# Patient Record
(demographics unavailable — no encounter records)

---

## 2024-10-30 NOTE — HISTORY OF PRESENT ILLNESS
[FreeTextEntry1] : Maya Zimmerman is a 79-year-old female seen by our office for known pancreatic cyst, stable back in March 2024. Pt now presents with referral from Dr. Hairston after findings of low iron saturation on blood work.    Patient had blood work done with PMD. Her Hgb, TIBC, Ferritin, and Iron were normal, just a low %sat of iron.Pt denies any visual blood noted. Pt denies any dizziness, lethargy, or SOB. Pt reports to be feeling clinically well. Previous colonoscopy/endoscopy in 2017 with Dr. Springer, polyps removed. 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 Denies any abdominal pain, episodes of nausea or vomiting, bowel complaints such as diarrhea or constipation. Denies any overt GI bleeding such as melena or hematochezia. No change in BM's. No hematemesis. Good appetite. No weight loss. No jaundice.

## 2024-10-30 NOTE — ADDENDUM
[FreeTextEntry1] : I was present with the NP during the history and exam. I examined the patient and discussed the case with the NP and agree with the findings and plan as documented in the NP's note. Briefly, 79 year old woman with pancreatic cyst and prior anemia with AVM's s/p double balloon enteroscopy, here with low iron sat.   Patient does not wish to pursue endoscopic eval, despite being due for colonoscopy as well. As her hgb, iron normal, FOBT earlier this year ok, this is not unreasonable but I did explain why she would need endoscopic eval, especially in light of prior polyps and due for colonoscopy. She does want to hold off for now. Told her to take OTC PO iron and to repeat studies (she will go to Dr. Hairston). Panc cyst management per prior rec's, will ensure she is on recall list for March 2025.    Total time spent including charting, examining/evaluating, and documentation: 32 minutes

## 2024-10-30 NOTE — HISTORY OF PRESENT ILLNESS
[FreeTextEntry1] : Maya Zimmerman is a 79-year-old female seen by our office for known pancreatic cyst, stable back in March 2024. Pt now presents with referral from Dr. Hairston after findings of low iron saturation on blood work.    Patient had blood work done with PMD. Her Hgb, TIBC, Ferritin, and Iron were normal, just a low %sat of iron.Pt denies any visual blood noted. Pt denies any dizziness, lethargy, or SOB. Pt reports to be feeling clinically well. Previous colonoscopy/endoscopy in 2017 with Dr. Springer, polyps removed. 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 Denies any abdominal pain, episodes of nausea or vomiting, bowel complaints such as diarrhea or constipation. Denies any overt GI bleeding such as melena or hematochezia. No change in BM's. No hematemesis. Good appetite. No weight loss. No jaundice.

## 2024-10-30 NOTE — PLAN
[TextEntry] :  Discussed endoscopy and colonoscopy in setting of low iron saturation to r/o possible GI bleeding as history of prior AVM's. However, pt feeling clinically well, does not wish to pursue at this time. Advised would recommend supplemental iron, and repeating values. Pt to call office if symptoms occur or begins to notice blood in stool. Pt also advised due for colonoscopy with personal history of polyps but she wants to hold off. Pt verbalized understanding, understands the importance of endoscopic eval but now wants to hold off.  Seen with Dr. Andres.

## 2024-10-30 NOTE — PHYSICAL EXAM
[Alert] : alert [Normal Voice/Communication] : normal voice/communication [Healthy Appearing] : healthy appearing [Sclera] : the sclera and conjunctiva were normal [Hearing Threshold Finger Rub Not [place]] : hearing was normal [Normal Lips/Gums] : the lips and gums were normal [Normal Appearance] : the appearance of the neck was normal [No Respiratory Distress] : no respiratory distress [Auscultation Breath Sounds / Voice Sounds] : lungs were clear to auscultation bilaterally [Heart Rate And Rhythm] : heart rate was normal and rhythm regular [Normal S1, S2] : normal S1 and S2 [Bowel Sounds] : normal bowel sounds [Abdomen Tenderness] : non-tender [Abdomen Soft] : soft [Abnormal Walk] : normal gait [Normal Color / Pigmentation] : normal skin color and pigmentation [Oriented To Time, Place, And Person] : oriented to person, place, and time [No Clubbing, Cyanosis] : no clubbing or cyanosis of the fingernails [] : no rash [No Focal Deficits] : no focal deficits [Motor Exam] : the motor exam was normal

## 2024-10-30 NOTE — ASSESSMENT
[FreeTextEntry1] : 79-year-old woman with pancreatic cyst and prior anemia with AVM's, referred by Dr. Hairston for low iron saturation.

## 2024-10-30 NOTE — PHYSICAL EXAM
[Alert] : alert [Normal Voice/Communication] : normal voice/communication [Healthy Appearing] : healthy appearing [Sclera] : the sclera and conjunctiva were normal [Hearing Threshold Finger Rub Not [place]] : hearing was normal [Normal Lips/Gums] : the lips and gums were normal [Normal Appearance] : the appearance of the neck was normal [No Respiratory Distress] : no respiratory distress [Auscultation Breath Sounds / Voice Sounds] : lungs were clear to auscultation bilaterally [Heart Rate And Rhythm] : heart rate was normal and rhythm regular [Normal S1, S2] : normal S1 and S2 [Bowel Sounds] : normal bowel sounds [Abdomen Tenderness] : non-tender [Abdomen Soft] : soft [Abnormal Walk] : normal gait [Normal Color / Pigmentation] : normal skin color and pigmentation [Oriented To Time, Place, And Person] : oriented to person, place, and time [No Clubbing, Cyanosis] : no clubbing or cyanosis of the fingernails [] : no rash [No Focal Deficits] : no focal deficits [Motor Exam] : the motor exam was normal

## 2024-10-30 NOTE — RESULTS/DATA
[TextEntry] : labs reviewed in detail, FOBT on Jan 8 2024 normal; Hgb, TIBC, Ferritin, and Iron were normal, %sat mildly low

## 2025-01-14 NOTE — PLAN
[FreeTextEntry1] : Patient to follow up in 1 year for annual GYN exam large leuks, UC sent, will call if needs treatment  Mammogram due: 12/25 Colonoscopy due: NA Bone density due: 12/25  Pap ordered Hemoccult ordered  All questions answered, patient agreeable with plan.   I Layla Jackson [facility] am scribing for the presence of Dr. Nazario the following sections HISTORY OF PRESENT ILLNESS, PAST MEDICAL/FAMILY/SOCIAL HISTORY; REVIEW OF SYSTEMS; VITAL SIGNS; PHYSICAL EXAM; DISPOSITION. I personally performed the services described in the documentation, reviewed the documentation recorded by the scribe in my presence and it accurately and completely records my words and actions.

## 2025-01-14 NOTE — PHYSICAL EXAM
[Chaperone Present] : A chaperone was present in the examining room during all aspects of the physical examination [36533] : A chaperone was present during the pelvic exam. [Appropriately responsive] : appropriately responsive [Alert] : alert [No Lymphadenopathy] : no lymphadenopathy [Soft] : soft [Non-tender] : non-tender [Oriented x3] : oriented x3 [Examination Of The Breasts] : a normal appearance [No Discharge] : no discharge [No Masses] : no breast masses were palpable [Vulvar Atrophy] : vulvar atrophy [Labia Majora] : normal [Labia Minora] : normal [Atrophy] : atrophy [No Bleeding] : There was no active vaginal bleeding [Uterine Adnexae] : normal [Normal rectal exam] : was normal [Normal] : normal [FreeTextEntry2] : Ada FNP-BC [Occult Blood Positive] : was negative for occult blood analysis

## 2025-01-14 NOTE — PHYSICAL EXAM
[Chaperone Present] : A chaperone was present in the examining room during all aspects of the physical examination [17167] : A chaperone was present during the pelvic exam. [Appropriately responsive] : appropriately responsive [Alert] : alert [No Lymphadenopathy] : no lymphadenopathy [Soft] : soft [Non-tender] : non-tender [Oriented x3] : oriented x3 [Examination Of The Breasts] : a normal appearance [No Discharge] : no discharge [No Masses] : no breast masses were palpable [Vulvar Atrophy] : vulvar atrophy [Labia Majora] : normal [Labia Minora] : normal [Atrophy] : atrophy [No Bleeding] : There was no active vaginal bleeding [Uterine Adnexae] : normal [Normal rectal exam] : was normal [Normal] : normal [FreeTextEntry2] : Ada FNP-BC [Occult Blood Positive] : was negative for occult blood analysis

## 2025-01-14 NOTE — HISTORY OF PRESENT ILLNESS
[FreeTextEntry1] : Patient is a 80yo female here today for annual visit. She denies any GYN complaints at this time. patient on boniva x 1 year

## 2025-01-14 NOTE — PLAN
[FreeTextEntry1] : Patient to follow up in 1 year for annual GYN exam large leuks, UC sent, will call if needs treatment  Mammogram due: 12/25 Colonoscopy due: NA Bone density due: 12/25  Pap ordered Hemoccult ordered  All questions answered, patient agreeable with plan.   I Layla Jacksno [facility] am scribing for the presence of Dr. Nazario the following sections HISTORY OF PRESENT ILLNESS, PAST MEDICAL/FAMILY/SOCIAL HISTORY; REVIEW OF SYSTEMS; VITAL SIGNS; PHYSICAL EXAM; DISPOSITION. I personally performed the services described in the documentation, reviewed the documentation recorded by the scribe in my presence and it accurately and completely records my words and actions.

## 2025-03-15 NOTE — HISTORY OF PRESENT ILLNESS
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   Patient was admitted to the hospital last week for lower GI bleeding. Bleeding was painless, she remained stable. Colonoscopy demonstrated diverticuli (likely source) and largre IC valve polyp. She also had a few other polyps in the colon. Her prep was poor which made colon and full evaluation difficult. She was discharged without incident and is here for follow up. She is feeling well. Stools are normal now, solid and brown, without any overt signs of GI bleeding like hematemesis, melena, hematochezia.

## 2025-03-15 NOTE — RESULTS/DATA
[TextEntry] : Dr. Virk's colonoscopy from in the hospital reviewed, performed on 3/4/24: diverticulosis, poor prep, no active bleeding, IC valve polyp

## 2025-03-15 NOTE — PHYSICAL EXAM
[Alert] : alert [Normal Voice/Communication] : normal voice/communication [Healthy Appearing] : healthy appearing [No Acute Distress] : no acute distress [Sclera] : the sclera and conjunctiva were normal [Hearing Threshold Finger Rub Not [place]] : hearing was normal [Normal Lips/Gums] : the lips and gums were normal [Oropharynx] : the oropharynx was normal [Normal Appearance] : the appearance of the neck was normal [No Neck Mass] : no neck mass was observed [No Respiratory Distress] : no respiratory distress [Abdomen Soft] : soft [Abnormal Walk] : normal gait [No Clubbing, Cyanosis] : no clubbing or cyanosis of the fingernails [Normal Color / Pigmentation] : normal skin color and pigmentation [] : no rash [No Focal Deficits] : no focal deficits [Motor Exam] : the motor exam was normal [Oriented To Time, Place, And Person] : oriented to person, place, and time

## 2025-03-15 NOTE — PHYSICAL EXAM
[Alert] : alert [Normal Voice/Communication] : normal voice/communication [Healthy Appearing] : healthy appearing [No Acute Distress] : no acute distress [Sclera] : the sclera and conjunctiva were normal [Hearing Threshold Finger Rub Not [place]] : hearing was normal [Normal Lips/Gums] : the lips and gums were normal [Oropharynx] : the oropharynx was normal [Normal Appearance] : the appearance of the neck was normal [No Neck Mass] : no neck mass was observed [No Respiratory Distress] : no respiratory distress [Abdomen Soft] : soft [Abnormal Walk] : normal gait [No Clubbing, Cyanosis] : no clubbing or cyanosis of the fingernails [Normal Color / Pigmentation] : normal skin color and pigmentation [] : no rash [No Focal Deficits] : no focal deficits [Motor Exam] : the motor exam was normal [Oriented To Time, Place, And Person] : oriented to person, place, and time

## 2025-03-15 NOTE — PLAN
[TextEntry] : Will plan on colonoscopy again to see if polyp amenable to EMR. Views from that colonoscopy not great due to prep. Will plan for colonoscpy with endoscopic mucosal resection.  Risks of endoscopic evaluation of the luminal GI tract were discussed at length with patient, including but not limited to bleeding, perforation, and delayed bleeding and perforation. Alternatives were discussed. Benefits of endoscopic evaluation were discussed. Higher risks of acute and delayed perforation and bleeding with EMR discussed with patient.    Total time spent including charting, examining/evaluating, and documentation: 33

## 2025-03-15 NOTE — ASSESSMENT
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

## 2025-07-14 NOTE — ASSESSMENT
[FreeTextEntry1] : 81 yo F with urinary retention in setting of systemic illness. RTO for TOV. If fails, will schedule UDS.   For frequent UTIs continue Hipprex. if UTI frequent continues, will consider changing therapy.

## 2025-07-14 NOTE — HISTORY OF PRESENT ILLNESS
[FreeTextEntry1] : 79 year old woman seen 06/27/2024 with complaint of UTI. SHe had history of frequent UTIs and was admitted to ICU in [place] in 2019 for urosepsis. She was worked up by Dr Horne and started on Hipprex. Since then, she had no UTIs. 05/2024 she was found to have another UTI despite hipprex. Treated with abx and now without sx. No new complaints.  mL.  07/14/2025: Patient presents for follow up. She was admitted to  for hyponatremia, serum sodium 116. Hosptial course was complicated by urinary retention, 400 mL on PVR. Sanchez placed. She is tolerating.

## 2025-07-14 NOTE — PHYSICAL EXAM
[Normal Appearance] : normal appearance [Well Groomed] : well groomed [General Appearance - In No Acute Distress] : no acute distress [Edema] : no peripheral edema [Respiration, Rhythm And Depth] : normal respiratory rhythm and effort [Exaggerated Use Of Accessory Muscles For Inspiration] : no accessory muscle use [Abdomen Soft] : soft [Abdomen Tenderness] : non-tender [Costovertebral Angle Tenderness] : no ~M costovertebral angle tenderness [Urinary Bladder Findings] : the bladder was normal on palpation [Normal Station and Gait] : the gait and station were normal for the patient's age [] : no rash [No Focal Deficits] : no focal deficits [Oriented To Time, Place, And Person] : oriented to person, place, and time [Affect] : the affect was normal [Mood] : the mood was normal [No Palpable Adenopathy] : no palpable adenopathy [de-identified] : hurtado draining clear yellow urine